# Patient Record
Sex: FEMALE | Race: BLACK OR AFRICAN AMERICAN | NOT HISPANIC OR LATINO | Employment: UNEMPLOYED | URBAN - METROPOLITAN AREA
[De-identification: names, ages, dates, MRNs, and addresses within clinical notes are randomized per-mention and may not be internally consistent; named-entity substitution may affect disease eponyms.]

---

## 2017-02-01 ENCOUNTER — TELEPHONE (OUTPATIENT)
Dept: GASTROENTEROLOGY | Facility: CLINIC | Age: 57
End: 2017-02-01

## 2017-02-01 NOTE — TELEPHONE ENCOUNTER
Called and spoke with pt  I explained that I have been trying to get her records and have been unsuccessful. I told her I finally reached Zoë in Dr Nova's office today and she will be faxing the records by the end of the wk. I told her once we receive we will be in touch to get her scheduled.  Pt is doing fine and appreciated the call

## 2017-02-08 ENCOUNTER — TELEPHONE (OUTPATIENT)
Dept: GASTROENTEROLOGY | Facility: CLINIC | Age: 57
End: 2017-02-08

## 2017-02-08 NOTE — TELEPHONE ENCOUNTER
----- Message from Jami Goff sent at 2/2/2017  3:00 PM CST -----  Contact: Zoë- Dr. Nova[facility]- [phone]  Kayy Camp called to speak with Henrietta about releasing the pts medical records- says its missing the pts signature- please call Zoë back at [phone]

## 2017-02-17 NOTE — TELEPHONE ENCOUNTER
Called Dr Nova's office again and spoke with Zoë. They will not send us any records without a signed consent. I explained [employer] does not require a signed consent with continuity of care and she placed me on hold and came back to the line and stated that is their policy. I asked if the pt could call and give a verbal consent and she said no has to be a signed form.     I called and spoke with pt  I explained that I have been going back and forth with Dr Nova's office and still can not get her records. I explained the conversation I had with Zoë and she said she will contact them on Monday and will call me back. I told her if I did not answer to LM and I would call back if not Monday then Tuesday. I explained if they give her trouble too I will go speak with Dr Persaud and Tasneem and see about getting her scheduled without records.  She appreciated my call

## 2017-02-23 NOTE — TELEPHONE ENCOUNTER
Called and spoke with pt  She stated she has had no luck getting in touch with Zoë from Dr Nova's office.  I told her I would speak with Dr Persaud and Tasneem and will be in touch to get her scheduled.  She appreciated my help

## 2017-03-01 ENCOUNTER — TELEPHONE (OUTPATIENT)
Dept: GASTROENTEROLOGY | Facility: CLINIC | Age: 57
End: 2017-03-01

## 2017-03-15 ENCOUNTER — HOSPITAL ENCOUNTER (OUTPATIENT)
Dept: RADIOLOGY | Facility: OTHER | Age: 57
Discharge: HOME | End: 2017-03-15
Attending: NURSE PRACTITIONER
Payer: MEDICAID

## 2017-03-15 DIAGNOSIS — Z12.31 ENCOUNTER FOR SCREENING MAMMOGRAM FOR BREAST CANCER: ICD-10-CM

## 2017-03-15 PROCEDURE — 77067 SCR MAMMO BI INCL CAD: CPT | Mod: TC

## 2017-03-15 PROCEDURE — 77067 SCR MAMMO BI INCL CAD: CPT | Mod: 26,,, | Performed by: RADIOLOGY

## 2017-03-15 PROCEDURE — 77063 BREAST TOMOSYNTHESIS BI: CPT | Mod: 26,,, | Performed by: RADIOLOGY

## 2017-03-27 ENCOUNTER — HOSPITAL ENCOUNTER (OUTPATIENT)
Dept: RADIOLOGY | Facility: OTHER | Age: 57
Discharge: HOME | End: 2017-03-27
Attending: NURSE PRACTITIONER
Payer: MEDICAID

## 2017-03-27 DIAGNOSIS — R92.8 OTHER (ABNORMAL) FINDINGS ON RADIOLOGICAL EXAMINATION OF BREAST: ICD-10-CM

## 2017-03-27 PROCEDURE — 77061 BREAST TOMOSYNTHESIS UNI: CPT | Mod: 26,,, | Performed by: RADIOLOGY

## 2017-03-27 PROCEDURE — 77065 DX MAMMO INCL CAD UNI: CPT | Mod: 26,,, | Performed by: RADIOLOGY

## 2017-03-27 PROCEDURE — 77061 BREAST TOMOSYNTHESIS UNI: CPT | Mod: TC

## 2017-12-29 ENCOUNTER — TELEPHONE (OUTPATIENT)
Dept: GASTROENTEROLOGY | Facility: CLINIC | Age: 57
End: 2017-12-29

## 2018-10-16 ENCOUNTER — HOSPITAL ENCOUNTER (OUTPATIENT)
Dept: RADIOLOGY | Facility: OTHER | Age: 58
Discharge: HOME | End: 2018-10-16
Attending: NURSE PRACTITIONER
Payer: MEDICAID

## 2018-10-16 DIAGNOSIS — Z12.31 ENCOUNTER FOR SCREENING MAMMOGRAM FOR MALIGNANT NEOPLASM OF BREAST: ICD-10-CM

## 2018-10-16 PROCEDURE — 77063 BREAST TOMOSYNTHESIS BI: CPT | Mod: 26,,, | Performed by: RADIOLOGY

## 2018-10-16 PROCEDURE — 77067 SCR MAMMO BI INCL CAD: CPT | Mod: TC

## 2018-10-16 PROCEDURE — 77063 BREAST TOMOSYNTHESIS BI: CPT | Mod: TC

## 2018-10-16 PROCEDURE — 77067 SCR MAMMO BI INCL CAD: CPT | Mod: 26,,, | Performed by: RADIOLOGY
